# Patient Record
Sex: MALE | Race: WHITE | NOT HISPANIC OR LATINO | Employment: FULL TIME | URBAN - METROPOLITAN AREA
[De-identification: names, ages, dates, MRNs, and addresses within clinical notes are randomized per-mention and may not be internally consistent; named-entity substitution may affect disease eponyms.]

---

## 2018-04-06 ENCOUNTER — ANESTHESIA EVENT (OUTPATIENT)
Dept: GASTROENTEROLOGY | Facility: HOSPITAL | Age: 43
End: 2018-04-06
Payer: COMMERCIAL

## 2018-04-06 ENCOUNTER — ANESTHESIA (OUTPATIENT)
Dept: GASTROENTEROLOGY | Facility: HOSPITAL | Age: 43
End: 2018-04-06
Payer: COMMERCIAL

## 2018-04-06 ENCOUNTER — HOSPITAL ENCOUNTER (OUTPATIENT)
Facility: HOSPITAL | Age: 43
Setting detail: OUTPATIENT SURGERY
Discharge: HOME | End: 2018-04-06
Attending: COLON & RECTAL SURGERY | Admitting: COLON & RECTAL SURGERY
Payer: COMMERCIAL

## 2018-04-06 VITALS
HEIGHT: 75 IN | BODY MASS INDEX: 25.49 KG/M2 | SYSTOLIC BLOOD PRESSURE: 125 MMHG | OXYGEN SATURATION: 100 % | TEMPERATURE: 98.6 F | HEART RATE: 90 BPM | DIASTOLIC BLOOD PRESSURE: 85 MMHG | RESPIRATION RATE: 18 BRPM | WEIGHT: 205 LBS

## 2018-04-06 DIAGNOSIS — K62.89 OTHER SPECIFIED DISEASES OF ANUS AND RECTUM (CODE): ICD-10-CM

## 2018-04-06 DIAGNOSIS — K62.5 HEMORRHAGE OF ANUS AND RECTUM: ICD-10-CM

## 2018-04-06 PROCEDURE — 88305 TISSUE EXAM BY PATHOLOGIST: CPT | Performed by: PATHOLOGY

## 2018-04-06 RX ORDER — SODIUM CHLORIDE 9 MG/ML
INJECTION, SOLUTION INTRAVENOUS CONTINUOUS PRN
Status: DISCONTINUED | OUTPATIENT
Start: 2018-04-06 | End: 2018-04-06 | Stop reason: SURG

## 2018-04-06 RX ORDER — PROPOFOL 10 MG/ML
INJECTION, EMULSION INTRAVENOUS CONTINUOUS PRN
Status: DISCONTINUED | OUTPATIENT
Start: 2018-04-06 | End: 2018-04-06 | Stop reason: SURG

## 2018-04-06 RX ORDER — PROPOFOL 10 MG/ML
INJECTION, EMULSION INTRAVENOUS AS NEEDED
Status: DISCONTINUED | OUTPATIENT
Start: 2018-04-06 | End: 2018-04-06 | Stop reason: SURG

## 2018-04-06 RX ADMIN — PROPOFOL 50 MG: 10 INJECTION, EMULSION INTRAVENOUS at 09:40

## 2018-04-06 RX ADMIN — PROPOFOL 50 MG: 10 INJECTION, EMULSION INTRAVENOUS at 09:44

## 2018-04-06 RX ADMIN — SODIUM CHLORIDE: 0.9 INJECTION, SOLUTION INTRAVENOUS at 09:20

## 2018-04-06 RX ADMIN — PROPOFOL 50 MG: 10 INJECTION, EMULSION INTRAVENOUS at 09:41

## 2018-04-06 RX ADMIN — PROPOFOL 100 MCG/KG/MIN: 10 INJECTION, EMULSION INTRAVENOUS at 09:40

## 2018-04-06 RX ADMIN — PROPOFOL 50 MG: 10 INJECTION, EMULSION INTRAVENOUS at 09:42

## 2018-04-06 NOTE — ANESTHESIA PREPROCEDURE EVALUATION
Review of Systems/Medical History          Cardiovascular  Hypertension ,    Pulmonary  Negative pulmonary ROS        GI/Hepatic      Comment: GI/rectal bleeding          Endo/Other  Negative endo/other ROS      GYN       Hematology  Negative hematology ROS      Musculoskeletal  Negative musculoskeletal ROS        Neurology  Negative neurology ROS      Psychology                Anesthesia Plan  ASA Score- 2     Anesthesia Type- IV sedation with anesthesia with ASA Monitors  Additional Monitors:   Airway Plan:     Comment: IV sedation,  standard ASA monitors  Risks and benefits discussed with patient; patient consented and agrees to proceed  I saw and evaluated the patient  If seen with CRNA, we have discussed the anesthetic plan and I am in agreement that the plan is appropriate for the patient        Plan Factors-    Induction- intravenous  Postoperative Plan-     Informed Consent- Anesthetic plan and risks discussed with patient  I personally reviewed this patient with the CRNA  Discussed and agreed on the Anesthesia Plan with the CRNA  Dajuan Solis

## 2018-04-06 NOTE — ANESTHESIA POSTPROCEDURE EVALUATION
Post-Op Assessment Note      CV Status:  Stable    Mental Status:  Alert and awake    Hydration Status:  Euvolemic    PONV Controlled:  Controlled    Airway Patency:  Patent    Post Op Vitals Reviewed: Yes          Staff: CRNA           BP   104/65   Temp      Pulse 87   Resp   22   SpO2   94 RA

## 2018-04-06 NOTE — H&P
History and Physical   Colon and Rectal Surgery   Yessenia Fox 37 y o  male MRN: [id number]  Unit/Bed#: [facility] Encounter: [id number]  04/06/18   8:58 AM      No chief complaint on file  History of Present Illness   HPI:  Yessenia Fox is a 37 y o  male who presents with rectal bleeding  Historical Information   No past medical history on file  No past surgical history on file  Meds/Allergies     No prescriptions prior to admission  No current facility-administered medications for this encounter  Allergies not on file      Social History   History   Alcohol use Not on file     History   Drug use: Unknown     History   Smoking Status    Not on file   Smokeless Tobacco    Not on file         Family History: No family history on file  Objective     Current Vitals:      No intake or output data in the 24 hours ending 04/06/18 0858    Physical Exam:  General: No acute distress  Eyes: Normal   ENT: Normal   Neck: No JVD  Pulm: Normal in A&P  CV: NSR no murmur  Abdomen: Soft and normal on palpation, no mass, no tenderness, no guarding  Rectal: Normal sphincter tone, no perianal skin lesions  Extremities: Normal  Lymphatics: Normal        Lab Results: I have personally reviewed pertinent lab results  Imaging: I have personally reviewed pertinent reports  Patient was consented by myself for procedure as explained earlier with all the risks and benefits described  All questions answered  ASSESSMENT:  Yessenia Fox is a 37 y o  male who presents with rectal bleeding        PLAN:  colonoscopy

## 2018-04-12 NOTE — OP NOTE
**** GI/ENDOSCOPY REPORT ****     PATIENT NAME: Leobardo Rainey ------ VISIT ID:     INTRODUCTION: Colonoscopy - A 37 male patient presents for an outpatient   Colonoscopy at [address] [place] [address]  PREVIOUS COLONOSCOPY:     INDICATIONS: Rectal bleeding  CONSENT:  The benefits, risks, and alternatives to the procedure were   discussed and informed consent was obtained from the patient  PREPARATION: EKG, pulse, pulse oximetry and blood pressure were monitored   throughout the procedure  The patient was identified by myself both   verbally and by visual inspection of ID band  ASA Classification: Class 2   - Patient has mild to moderate systemic disturbance that may or may not be   related to the disorder requiring surgery  Airway Assessment   Classification: Airway class 1 - Visualization of the soft palate, fauces,   uvula, and posterior pillars  MEDICATIONS: Anesthesia-check records     RECTAL EXAM: Normal sphincter tone  No external hemorrhoids  No internal   hemorrhoids  Prostate not enlarged  No prostatic nodules  PROCEDURE:  The endoscope was passed without difficulty through the anus   under direct visualization and advanced to the cecum, confirmed by   photographs  The quality of the preparation was  The scope was withdrawn   and the mucosa was carefully examined  The views were good  The patient's   toleration of the procedure was good  Cecal Intubation Time: 6 minutes(s)   Scope Withdrawal Time: 23 minutes(s)     FINDINGS:  A single frond-like/villous, sessile polyp, measuring 2 5 cm in   size, was found in the proximal ascending colon  A mucosal   resection (saline assisted) was performed, with success  The polyp was   removed by snare cautery polypectomy and placed in jar 1  The site was   marked with 3 injections of tattoo ([place] ink)   A single   frond-like/villous, semi-pedunculated polyp, measuring between 10 and 20   mm in size, was found in the rectosigmoid junction  The polyp was   removed by snare cautery polypectomy and placed in jar 2  Otherwise, the   colon appeared to be normal  Appendiceal opening identified     COMPLICATIONS: There were no complications  IMPRESSIONS: A single frond-like/villous, sessile polyp found in the   proximal ascending colon; removed by snare cautery polypectomy  Mucosal   resection was performed  Chromoscopy procedure performed  A single   frond-like/villous, semi-pedunculated polyp found in the rectosigmoid   junction; removed by snare cautery polypectomy  Otherwise normal   colonoscopy  RECOMMENDATIONS: Colonoscopy recommended in 3 months, if benign Continue   current medications  Start high fiber diet  Daily: 81 mg aspirin, Vit  D,   five fruits daily, and exercise  Call if you are having any problems: Dr Suad Olmedo [phone]  Contact your parents and siblings and assure   colonoscopic evaluation for all over ages 28-36  ESTIMATED BLOOD LOSS:     PATHOLOGY SPECIMENS: Removed by snare cautery polypectomy (jar 1)  Removed   by snare cautery polypectomy (jar 2)  Yes     PROCEDURE CODES: : Colonoscopy with directed submucosal injection(s) :   Colonoscopy with snare polypectomy     ICD-9 Codes: 569 3 Hemorrhage of rectum and anus 211 3 Benign neoplasm of   colon 211 3 Benign neoplasm of colon     ICD-10 Codes: K62 5 Hemorrhage of anus and rectum K63 5 Polyp of colon   K63 5 Polyp of colon     PERFORMED BY: TIMI Rosa  on 04/06/2018  Version 1, electronically signed by TIMI Rosa  on   04/06/2018 at 10:24

## 2018-09-28 ENCOUNTER — ANESTHESIA EVENT (OUTPATIENT)
Dept: PERIOP | Facility: AMBULARY SURGERY CENTER | Age: 43
End: 2018-09-28
Payer: COMMERCIAL

## 2018-10-12 ENCOUNTER — HOSPITAL ENCOUNTER (OUTPATIENT)
Facility: AMBULARY SURGERY CENTER | Age: 43
Setting detail: OUTPATIENT SURGERY
Discharge: HOME | End: 2018-10-12
Attending: COLON & RECTAL SURGERY | Admitting: COLON & RECTAL SURGERY
Payer: COMMERCIAL

## 2018-10-12 ENCOUNTER — ANESTHESIA (OUTPATIENT)
Dept: PERIOP | Facility: AMBULARY SURGERY CENTER | Age: 43
End: 2018-10-12
Payer: COMMERCIAL

## 2018-10-12 VITALS
TEMPERATURE: 97.3 F | RESPIRATION RATE: 18 BRPM | HEIGHT: 75 IN | BODY MASS INDEX: 21.14 KG/M2 | HEART RATE: 79 BPM | WEIGHT: 170 LBS | OXYGEN SATURATION: 97 % | SYSTOLIC BLOOD PRESSURE: 120 MMHG | DIASTOLIC BLOOD PRESSURE: 82 MMHG

## 2018-10-12 DIAGNOSIS — K63.5 POLYP OF COLON: ICD-10-CM

## 2018-10-12 PROCEDURE — 88305 TISSUE EXAM BY PATHOLOGIST: CPT | Performed by: PATHOLOGY

## 2018-10-12 PROCEDURE — 45384 COLONOSCOPY W/LESION REMOVAL: CPT | Performed by: COLON & RECTAL SURGERY

## 2018-10-12 RX ORDER — SODIUM CHLORIDE 9 MG/ML
INJECTION, SOLUTION INTRAVENOUS CONTINUOUS PRN
Status: DISCONTINUED | OUTPATIENT
Start: 2018-10-12 | End: 2018-10-12 | Stop reason: SURG

## 2018-10-12 RX ORDER — PROPOFOL 10 MG/ML
INJECTION, EMULSION INTRAVENOUS CONTINUOUS PRN
Status: DISCONTINUED | OUTPATIENT
Start: 2018-10-12 | End: 2018-10-12 | Stop reason: SURG

## 2018-10-12 RX ORDER — PROPOFOL 10 MG/ML
INJECTION, EMULSION INTRAVENOUS AS NEEDED
Status: DISCONTINUED | OUTPATIENT
Start: 2018-10-12 | End: 2018-10-12 | Stop reason: SURG

## 2018-10-12 RX ORDER — SODIUM CHLORIDE 9 MG/ML
75 INJECTION, SOLUTION INTRAVENOUS CONTINUOUS
Status: DISCONTINUED | OUTPATIENT
Start: 2018-10-12 | End: 2018-10-12 | Stop reason: HOSPADM

## 2018-10-12 RX ADMIN — PROPOFOL 50 MG: 10 INJECTION, EMULSION INTRAVENOUS at 08:58

## 2018-10-12 RX ADMIN — SODIUM CHLORIDE: 0.9 INJECTION, SOLUTION INTRAVENOUS at 08:35

## 2018-10-12 RX ADMIN — PROPOFOL 50 MG: 10 INJECTION, EMULSION INTRAVENOUS at 08:55

## 2018-10-12 RX ADMIN — PROPOFOL 100 MCG/KG/MIN: 10 INJECTION, EMULSION INTRAVENOUS at 08:58

## 2018-10-12 RX ADMIN — PROPOFOL 50 MG: 10 INJECTION, EMULSION INTRAVENOUS at 09:03

## 2018-10-12 RX ADMIN — PROPOFOL 50 MG: 10 INJECTION, EMULSION INTRAVENOUS at 08:56

## 2018-10-12 NOTE — H&P
History and Physical   Colon and Rectal Surgery   Lesa Leonard 37 y o  male MRN: [id number]  Unit/Bed#:  Encounter: [id number]  10/11/18   8:47 PM      No chief complaint on file  History of Present Illness   HPI:  Lesa Leonard is a 37 y o  male who presents with h/o large sessile polyp 4/2018 for re-evaluation  Mikki Small Historical Information   Past Medical History:   Diagnosis Date    Hypertension     no medication      Past Surgical History:   Procedure Laterality Date    NO PAST SURGERIES      [place] COLONOSCOPY FLX DX W/COLLJ SPEC WHEN PFRMD N/A 4/6/2018    Procedure: COLONOSCOPY;  Surgeon: Makeda Farrell MD;  Location: [facility]; Service: Colorectal       Meds/Allergies     No prescriptions prior to admission  No current facility-administered medications for this encounter  No current outpatient prescriptions on file  No Known Allergies      Social History   History   Alcohol Use    Yes     Comment: socially      History   Drug Use No     History   Smoking Status    Never Smoker   Smokeless Tobacco    Never Used         Family History: No family history on file  Objective     Current Vitals:      No intake or output data in the 24 hours ending 10/11/18 2047    Physical Exam:  General: No acute distress  Eyes: Normal   ENT: Normal   Neck: No JVD  Pulm: Normal in A&P  CV: NSR no murmur  Abdomen: Soft and normal on palpation, no mass, no tenderness, no guarding  Rectal: Normal sphincter tone, no perianal skin lesions  Extremities: Normal  Lymphatics: Normal        Lab Results: I have personally reviewed pertinent lab results  Imaging: I have personally reviewed pertinent reports  Patient was consented by myself for procedure as explained earlier with all the risks and benefits described  All questions answered  ASSESSMENT:  Lesa Leonard is a 37 y o  male who presents with h/o large sessile polyp 4/2018 for re-evaluation        PLAN:  colonoscopy

## 2018-10-12 NOTE — ANESTHESIA POSTPROCEDURE EVALUATION
Post-Op Assessment Note      CV Status:  Stable    Mental Status:  Awake    Hydration Status:  Euvolemic    PONV Controlled:  Controlled    Airway Patency:  Patent    Post Op Vitals Reviewed: Yes          Staff: CRNA           BP   117/67   Temp      Pulse  86   Resp   22   SpO2   97 RA

## 2018-10-12 NOTE — OP NOTE
OPERATIVE REPORT  PATIENT NAME: Mariama Franz    :  1975  MRN: [id number]  Pt Location: [facility]  GI ROOM 01    SURGERY DATE: 10/12/2018    Surgeon(s) and Role:     * Shaheen Gee MD - Primary    Preop Diagnosis:  Polyp of colon [K63 5]    Post-Op Diagnosis Codes:     * Polyp of colon [K63 5]    Procedure(s) (LRB):  COLONOSCOPY (N/A)    Specimen(s):  ID Type Source Tests Collected by Time Destination   1 : Hot Bx Splenic Flexure polyp Tissue Large Intestine, Splenic Flexure TISSUE EXAM Shaheen Gee MD 10/12/2018 9746        Estimated Blood Loss:   Minimal    Drains:       Anesthesia Type:   IV Sedation with Anesthesia    Operative Indications:  Polyp of colon [K63 5]      Operative Findings:  8 mm polyp splenic flexure hot biopsy excised  Site of previous large sessile polypectomy proximal ascending colon was seen by tattooed surface well-healed with no evidence of recurrent polyp  Internal external hemorrhoid class 3  Prostate mildly enlarged soft and smooth on palpation with no nodularity  Mild diverticular disease sigmoid colon      Complications:   None    Procedure and Technique:  Colonoscopy Procedure Note    Procedure Details     Informed consent was obtained for the procedure, including sedation  Risks of perforation, hemorrhage, adverse drug reaction and aspiration were discussed  The patient was placed in the left lateral decubitus position  Based on the pre-procedure assessment, including review of the patient's medical history, medications, allergies, and review of systems, he had been deemed to be an appropriate candidate for conscious sedation; he was therefore sedated with the medications listed below  The patient was monitored continuously with ECG tracing, pulse oximetry, blood pressure monitoring, and direct observations  A rectal examination was performed    The variable-stiffness pediatric colonoscope was inserted into the rectum and advanced under direct vision to the cecum, which was identified by the ileocecal valve and appendiceal orifice  The quality of the colonic preparation was excellent  A careful inspection was made as the colonoscope was withdrawn, including a retroflexed view of the rectum; findings and interventions are described below  Appropriate photodocumentation was obtained  Findings:  -diverticulosis, mild in degree, involving the sigmoid  -hemorrhoids (internal and external), Moderate in size  -polyp(s) - #1, 8 mm in size, located in the splenic flexure, removed by hot biopsy and sent for pathology    Specimens:            Complications:  None; patient tolerated the procedure well  Disposition: PACU            Condition: stable    Attending Attestation: I was present for the entire procedure    Impression:    -Benign appearing colon polyps, removed as above  Recommendations:  -Await pathology  ,   -repeat colonoscopy in 1 year  High-fiber diet  Call office for [facility] stapled hemorrhoidectomy  Call [phone] with any signs or symptoms of diverticulitis or rectal bleeding due to  diverticular bleed       I was present for the entire procedure    Patient Disposition:  PACU     SIGNATURE: Niesha Isaac MD  DATE: October 12, 2018  TIME: 9:15 AM

## 2018-10-12 NOTE — ANESTHESIA PREPROCEDURE EVALUATION
Review of Systems/Medical History  Patient summary reviewed  Chart reviewed  No history of anesthetic complications     Cardiovascular  No hypertension (borderline HTN) ,    Pulmonary       GI/Hepatic            Endo/Other     GYN       Hematology   Musculoskeletal       Neurology   Psychology           Physical Exam    Airway    Mallampati score: II  TM Distance: >3 FB  Neck ROM: full     Dental       Cardiovascular  Rhythm: regular, Rate: normal,     Pulmonary  Breath sounds clear to auscultation,     Other Findings        Anesthesia Plan  ASA Score- 1     Anesthesia Type- IV sedation with anesthesia with ASA Monitors  Additional Monitors:   Airway Plan:         Plan Factors-    Induction- intravenous  Postoperative Plan-     Informed Consent- Anesthetic plan and risks discussed with patient  I personally reviewed this patient with the CRNA  Discussed and agreed on the Anesthesia Plan with the CRNA  Sariah Perry

## 2025-02-18 ENCOUNTER — OCCMED (OUTPATIENT)
Dept: URGENT CARE | Facility: CLINIC | Age: 50
End: 2025-02-18

## 2025-02-18 DIAGNOSIS — Z02.1 PHYSICAL EXAM, PRE-EMPLOYMENT: Primary | ICD-10-CM

## (undated) DEVICE — NEEDLE SCLERO INTERJECT 25G 240MM